# Patient Record
Sex: FEMALE | Race: OTHER | HISPANIC OR LATINO | Employment: UNEMPLOYED | URBAN - METROPOLITAN AREA
[De-identification: names, ages, dates, MRNs, and addresses within clinical notes are randomized per-mention and may not be internally consistent; named-entity substitution may affect disease eponyms.]

---

## 2024-08-29 DIAGNOSIS — Z34.03 ENCOUNTER FOR SUPERVISION OF NORMAL FIRST PREGNANCY IN THIRD TRIMESTER: Primary | ICD-10-CM

## 2024-08-29 LAB
APPEARANCE, UA: CLEAR
BACTERIA SPEC CULT: ABNORMAL /HPF
BILIRUB UR QL STRIP: NEGATIVE MG/DL
COLOR UR: ABNORMAL
FETAL FIBRONECTIN: NEGATIVE
GLUCOSE (UA): NORMAL MG/DL
HGB UR QL STRIP: 25 /UL
KETONES UR QL STRIP: ABNORMAL MG/DL
LEUKOCYTE ESTERASE UR QL STRIP: 25 /UL
NITRITE UR QL STRIP: NEGATIVE
PH UR STRIP: 7 PH (ref 5–9)
PROT UR QL STRIP: NEGATIVE MG/DL
RBC #/AREA URNS HPF: ABNORMAL /HPF (ref 0–2)
SERVICE COMMENT 03: ABNORMAL
SP GR UR STRIP: 1.01 (ref 1–1.03)
SPECIMEN COLLECTION METHOD, URINE: ABNORMAL
SQUAMOUS EPITHELIAL, UA: ABNORMAL /LPF
UROBILINOGEN UR STRIP-ACNC: NORMAL MG/DL
WBC #/AREA URNS HPF: ABNORMAL /HPF (ref 0–5)

## 2024-08-30 ENCOUNTER — TELEPHONE (OUTPATIENT)
Dept: OBSTETRICS AND GYNECOLOGY | Facility: CLINIC | Age: 29
End: 2024-08-30

## 2024-08-30 NOTE — TELEPHONE ENCOUNTER
----- Message from Amparo Haddad sent at 8/30/2024 12:46 PM CDT -----  Contact: Ashley Benitez called in she was seen in the er on yesterday and was told to schedule with Dr Leigh. She is Seven months pregnant. She is having pain in her womb she have been experiencing this pain for Four days but Er has given her medication so its easing on today. She was told that her possible Due Date is October.. She would like to schedule an appointment with . Call Back is [phone]

## 2024-08-30 NOTE — TELEPHONE ENCOUNTER
----- Message from Amparo Haddad sent at 8/30/2024  4:19 PM CDT -----  Contact: Ashley  Type:  Patient Returning Call    Who Called:Patient  Who Left Message for Patient:Darrell Adame MA  Does the patient know what this is regarding?:Yes   Would the patient rather a call back or a response via [email or web address]? Call Back   Best Call Back Number:[phone]  Additional Information:

## 2024-09-09 ENCOUNTER — TELEPHONE (OUTPATIENT)
Dept: OBSTETRICS AND GYNECOLOGY | Facility: CLINIC | Age: 29
End: 2024-09-09
